# Patient Record
Sex: MALE | URBAN - METROPOLITAN AREA
[De-identification: names, ages, dates, MRNs, and addresses within clinical notes are randomized per-mention and may not be internally consistent; named-entity substitution may affect disease eponyms.]

---

## 2019-04-02 ENCOUNTER — APPOINTMENT (OUTPATIENT)
Dept: URBAN - METROPOLITAN AREA CLINIC 186 | Age: 39
Setting detail: DERMATOLOGY
End: 2019-04-02

## 2019-04-02 DIAGNOSIS — R21 RASH AND OTHER NONSPECIFIC SKIN ERUPTION: ICD-10-CM

## 2019-04-02 DIAGNOSIS — L28.0 LICHEN SIMPLEX CHRONICUS: ICD-10-CM

## 2019-04-02 PROBLEM — L20.84 INTRINSIC (ALLERGIC) ECZEMA: Status: ACTIVE | Noted: 2019-04-02

## 2019-04-02 PROCEDURE — 99202 OFFICE O/P NEW SF 15 MIN: CPT

## 2019-04-02 PROCEDURE — OTHER BLEACH BATH INSTRUCTIONS: OTHER

## 2019-04-02 PROCEDURE — OTHER PRESCRIPTION: OTHER

## 2019-04-02 PROCEDURE — OTHER COUNSELING: OTHER

## 2019-04-02 RX ORDER — EMOLLIENT COMBINATION NO.32
EMULSION, EXTENDED RELEASE TOPICAL
Qty: 1 | Refills: 4 | Status: ERX | COMMUNITY
Start: 2019-04-02

## 2019-04-02 RX ORDER — DESOXIMETASONE 2.5 MG/G
CREAM TOPICAL
Qty: 1 | Refills: 0 | Status: ERX | COMMUNITY
Start: 2019-04-02

## 2019-04-02 ASSESSMENT — LOCATION SIMPLE DESCRIPTION DERM
LOCATION SIMPLE: RIGHT ANKLE
LOCATION SIMPLE: LEFT HAND
LOCATION SIMPLE: LEFT FOOT
LOCATION SIMPLE: LEFT AXILLARY VAULT
LOCATION SIMPLE: RIGHT LOWER BACK
LOCATION SIMPLE: RIGHT HAND
LOCATION SIMPLE: RIGHT AXILLARY VAULT

## 2019-04-02 ASSESSMENT — LOCATION DETAILED DESCRIPTION DERM
LOCATION DETAILED: RIGHT RADIAL DORSAL HAND
LOCATION DETAILED: LEFT AXILLARY VAULT
LOCATION DETAILED: RIGHT INFERIOR MEDIAL MIDBACK
LOCATION DETAILED: RIGHT ANKLE
LOCATION DETAILED: LEFT DORSAL FOOT
LOCATION DETAILED: LEFT RADIAL DORSAL HAND
LOCATION DETAILED: RIGHT AXILLARY VAULT

## 2019-04-02 ASSESSMENT — LOCATION ZONE DERM
LOCATION ZONE: TRUNK
LOCATION ZONE: AXILLAE
LOCATION ZONE: FEET
LOCATION ZONE: LEG
LOCATION ZONE: HAND

## 2019-04-02 ASSESSMENT — PAIN INTENSITY VAS: HOW INTENSE IS YOUR PAIN 0 BEING NO PAIN, 10 BEING THE MOST SEVERE PAIN POSSIBLE?: NO PAIN

## 2019-04-02 ASSESSMENT — BSA RASH: BSA RASH: 1

## 2019-04-02 ASSESSMENT — SEVERITY ASSESSMENT: SEVERITY: MILD TO MODERATE

## 2019-04-02 NOTE — HPI: RASH (ECZEMA)
How Severe Is Your Eczema?: moderate
Is This A New Presentation, Or A Follow-Up?: Rash
Additional History: the patient needs a new prescription.

## 2019-04-02 NOTE — PROCEDURE: BLEACH BATH INSTRUCTIONS
Detail Level: Detailed
[facility]ing: I recommended weekly bleach baths. This is accomplished by filling a bathtub with warm to hot water and then adding in 1/4 cup of bleach. The patient should then soak for 15-20 minutes and then rinse off.\\nor 12 drops in one quart bottle spritz twice a week

## 2019-04-10 ENCOUNTER — RX ONLY (RX ONLY)
Age: 39
End: 2019-04-10

## 2019-04-10 RX ORDER — EMOLLIENT COMBINATION NO.60
SPRAY GEL TOPICAL TWICE DAILY
Qty: 1 | Refills: 4 | Status: ERX | COMMUNITY
Start: 2019-04-10

## 2019-04-11 ENCOUNTER — RX ONLY (RX ONLY)
Age: 39
End: 2019-04-11

## 2019-04-11 RX ORDER — EMOLLIENT COMBINATION NO.60
SPRAY GEL TOPICAL TWICE DAILY
Qty: 1 | Refills: 4 | Status: ERX